# Patient Record
Sex: MALE | Race: WHITE | Employment: FULL TIME | URBAN - METROPOLITAN AREA
[De-identification: names, ages, dates, MRNs, and addresses within clinical notes are randomized per-mention and may not be internally consistent; named-entity substitution may affect disease eponyms.]

---

## 2019-02-19 ENCOUNTER — OFFICE VISIT (OUTPATIENT)
Dept: SLEEP MEDICINE | Facility: CLINIC | Age: 49
End: 2019-02-19
Payer: COMMERCIAL

## 2019-02-19 VITALS
SYSTOLIC BLOOD PRESSURE: 114 MMHG | HEART RATE: 65 BPM | HEIGHT: 69 IN | OXYGEN SATURATION: 96 % | BODY MASS INDEX: 32.38 KG/M2 | DIASTOLIC BLOOD PRESSURE: 76 MMHG | WEIGHT: 218.6 LBS

## 2019-02-19 DIAGNOSIS — G47.39 COMPLEX SLEEP APNEA SYNDROME: Primary | ICD-10-CM

## 2019-02-19 DIAGNOSIS — E66.811 OBESITY (BMI 30.0-34.9): ICD-10-CM

## 2019-02-19 PROCEDURE — 99204 OFFICE O/P NEW MOD 45 MIN: CPT | Performed by: INTERNAL MEDICINE

## 2019-02-19 RX ORDER — FLUTICASONE PROPIONATE 50 MCG
1 SPRAY, SUSPENSION (ML) NASAL DAILY
COMMUNITY

## 2019-02-19 ASSESSMENT — MIFFLIN-ST. JEOR: SCORE: 1859.68

## 2019-02-19 NOTE — PROGRESS NOTES
Changed pressure on resmed aircurve ASV machine to epap min 6, epap max 9, ps min 0, ps max19, rate auto manual adjustment.  Faxed signed CARLITOS , copy of split [facility], copy of split 2007, XOCHILT Hyatt, demographics, office notes, machine type, and mask type Resmed FF F20, size medium to Transfer Team at [facility] Attn: Rafat LOPEZ (transfer person of the day), [facility].  Asked for patient to receive chinstrap asap.

## 2019-02-19 NOTE — PATIENT INSTRUCTIONS
MY TREATMENT INFORMATION FOR SLEEP APNEA-  Frandy AGNIESZKA Interiano    MY CONTACT NUMBERS ARE:  [phone]  DOCTOR : Oli CHRISTIE [facility]  SLEEP CENTER :  [place]    Your sleep apnea is not controlled with ASV.  Pressure change will be done. Will get MRI of brain and ECHO for work up of complex sleep apnea.  Mask and supplies are ordered. Use chin strap  Continue use of ASV:  - Recommend using ASV every night for at least 7-8 hours each night  - Daytime naps are not advised, but use ASV if taking naps.    - Do not remove mask headgear when you go to bathroom at night, but just unplug the hose.    Objective measure goal  Compliance  Goal >70% (preferrably 100%)  Leak   Goal < 10% (less than 24 L/min)  AHI  Goal < 5 events per hour   Usage  Goal 7-8 hours.      Patient was advised not to drive if drowsy or sleepy.      Follow up in 2 months.      Your blood pressure was checked while you were in clinic today.  Please read the guidelines below about what these numbers mean and what you should do about them.  Your systolic blood pressure is the top number.  This is the pressure when the heart is pumping.  Your diastolic blood pressure is the bottom number.  This is the pressure in between beats.  If your systolic blood pressure is less than 120 and your diastolic blood pressure is less than 80, then your blood pressure is normal. There is nothing more that you need to do about it  If your systolic blood pressure is 120-139 or your diastolic blood pressure is 80-89, your blood pressure may be higher than it should be.  You should have your blood pressure re-checked within a year by a primary care provider.  If your systolic blood pressure is 140 or greater or your diastolic blood pressure is 90 or greater, you may have high blood pressure.  High blood pressure is treatable, but if left untreated over time it can put you at risk for heart attack, stroke, or kidney failure.  You should have your blood pressure re-checked by a  primary care provider within the next four weeks.  Your BMI is Body mass index is 31.83 kg/m .  Weight management is a personal decision.  If you are interested in exploring weight loss strategies, the following discussion covers the approaches that may be successful. Body mass index (BMI) is one way to tell whether you are at a healthy weight, overweight, or obese. It measures your weight in relation to your height.  A BMI of 18.5 to 24.9 is in the healthy range. A person with a BMI of 25 to 29.9 is considered overweight, and someone with a BMI of 30 or greater is considered obese. More than two-thirds of American adults are considered overweight or obese.  Being overweight or obese increases the risk for further weight gain. Excess weight may lead to heart disease and diabetes.  Creating and following plans for healthy eating and physical activity may help you improve your health.  Weight control is part of healthy lifestyle and includes exercise, emotional health, and healthy eating habits. Careful eating habits lifelong are the mainstay of weight control. Though there are significant health benefits from weight loss, long-term weight loss with diet alone may be very difficult to achieve- studies show long-term success with dietary management in less than 10% of people. Attaining a healthy weight may be especially difficult to achieve in those with severe obesity. In some cases, medications, devices and surgical management might be considered.  What can you do?  If you are overweight or obese and are interested in methods for weight loss, you should discuss this with your provider.     Consider reducing daily calorie intake by 500 calories.     Keep a food journal.     Avoiding skipping meals, consider cutting portions instead.    Diet combined with exercise helps maintain muscle while optimizing fat loss. Strength training is particularly important for building and maintaining muscle mass. Exercise helps reduce  stress, increase energy, and improves fitness. Increasing exercise without diet control, however, may not burn enough calories to loose weight.       Start walking three days a week 10-20 minutes at a time    Work towards walking thirty minutes five days a week     Eventually, increase the speed of your walking for 1-2 minutes at time    In addition, we recommend that you review healthy lifestyles and methods for weight loss available through the National Institutes of Health patient information sites:  http://win.niddk.nih.gov/publications/index.htm    And look into health and wellness programs that may be available through your health insurance provider, employer, local community center, or darshan club.

## 2019-02-19 NOTE — NURSING NOTE
"Chief Complaint   Patient presents with     Consult     trouble with asv, prev study [facility] 2016, ss [facility] 2007  having central apneas, retested.  still removes the asv.         Initial /76   Pulse 65   Ht 1.765 m (5' 9.49\")   Wt 99.2 kg (218 lb 9.6 oz)   SpO2 96%   BMI 31.83 kg/m   Estimated body mass index is 31.83 kg/m  as calculated from the following:    Height as of this encounter: 1.765 m (5' 9.49\").    Weight as of this encounter: 99.2 kg (218 lb 9.6 oz).    Medication Reconciliation: complete    Neck circumference: 17 inches / 42centimeters.    DME:yes asv resmed     ESS 9  "

## 2019-02-19 NOTE — PROGRESS NOTES
Sleep Center [facility]  Outpatient Sleep Medicine Consultation  February 19, 2019      Name: Frandy Interiano MRN# [id number]   Age: 48 year old YOB: 1970     Date of Consultation: February 19, 2019  Consultation is requested by: Adrián Wilcox MD  No address on file  Primary care provider: Nai Gayle  Home clinic: [facility] [address] [place] [address]       Reason for Sleep Consult:     Frandy Interiano is a 48 year old male for reevaluation of sleep apnea and having trouble with his ASV machine and establish care.         Assessment and Plan:     Summary Sleep Diagnoses/Recommendations:    1.  Complex sleep Apnea:  - Non compliance of PAP use with residual AHI of 8.2/hr. patient is intolerant with his ASV machine usage, history of failure of auto CPAP on BiPAP due to central sleep apnea.  -Changes of ASV pressure to EPAP min 6, EPAP max 9, PS min 0 and PS max 19 cmH2O as he was on low pressures that does not improve patient's tolerance of machine usage and residual AHI. If above fails, will consider repeat ASV titration in lab sleep study.  -Obtain 2D echocardiogram for ejection fraction as he uses ASV machine per guidelines. Also obtain MRI of brain for Chiari Malformation for work up of central apnea.  - he has bloated air with machine due to mouth breathing, use full face mask with chin strap. Avoid supine sleep too.  - Clinical response: poor  - Recommend using CPAP every night for at least 7-8 hours each night  - Daytime naps are not advised, but use CPAP if taking naps  - Follow up in sleep clinic in 2 months.    2. Obesity:  Counseled regarding weight loss through diet modification and increased physical activity. Patient was given instuctions of weight loss and advised to follow up her PCP for further weight loss interventions.      Orders Placed This Encounter   Procedures     Comprehensive DME     MR Brain w/o & w Contrast     Echocardiogram Complete       Summary  Counseling:  See instructions    Counseling included a comprehensive review of diagnostic and therapeutic strategies as well as risks of inadequate therapy.  Educational materials provided in instructions.    All questions were answered.  The patient indicates understanding of the above issues and agrees with the plan set forth.           History of Present Illness:     Frandy Interiano is a 48 year old male with history of complex sleep apnea, depression and anxiety disorder who presents to the [facility] in [place] for evaluation of sleep apnea and establish care intolerance with ASV machine.   Patient was diagnosed with complex sleep apnea in the 2007 with AHI 62.1/hr (OSMAR 24/hr) and was treated with auto-CPAP and BiPAP in the past but failed. He had sleep study date on 9/24/16 and was diagnosed with complex sleep apnea (predominantly central Sleep Apnea). The AHI (Apnea Hypopnea Index) was 89/hour; the OSMAR was 57/hr. The lowest SaO2 was 81%. He was prescribed ResMed ASV (Adaptive Servo Ventilation) with setting is EPAP 7-11, PS 3-8 cmH2O. patient was intolerant with his ASV device for 2 hours every night.  His clinic visit 5/24/17, his ASV pressures was changed to EPAP 7-10 and pressure support 0-5 cm H2O, in addition recommended REMzzzs mask liners to minimize leak.  No echocardiographic is available epic everywhere. He changed his ASV pressures EPAP 6-9 and PS 3-8 cmH2O as he felt bloated with air.  Patient denies taking narcotics during the prior sleep studies.   Patient remains intolerant to ASV machine, he uses most of the night but he removes and after 1 hour/half of his usage. He bloated when he on high pressures.  Patient had witnessed apnea, snoring, waking up gasping air, morning headaches, dry mouth/throat.  The patient sleepiness is affecting his daytime job and tasks. His symptoms worse with supine position.    Please see below for sleep ROS details.    PREVIOUS IN- LAB or HOME SLEEP  STUDIES:  Date: 10/18/2007  TYPE: PSG  AHI: 62.1/hr  Central apnea Index: 24/hr  BMI:  Intervention: auto-CPAP  Lowest O2 saturation: 81%    Date: 9/24/2016 done [facility]  TYPE: PSG  AHI: 89/hr  Central apnea Index: 57/hr  Intervention: ASV EPAP 7-11, PS 3-8 cmH2O.   Lowest O2 saturation: 81%    CPAP COMPLIANCE:  Dates:  1/21/2018-  2/18/2019         0 % >4hour use     Days used: 57/60  Average daily use: 1.16 hrs  Leak 95% percentile: 0.4 l/min  Residual AHI: 8.2 /hr  Pressure:  ASV  pressures EPAP 6-9 and PS 3-8 cmH2O      PAP machine: ResMed    Mask: full face mask  Chin strap: No  Leak: No  Humidity: Yes    Difficulties with therapy:    [x] Difficulty tolerating the pressure, use to have bloated   [-] Epistaxis:   [-] Nasal congestion   [-] Dry mouth: occasional  [x] Mouth breathing:   [-] Pain/skin breakdown:     Improvements noted with ASV:   [+] waking up more refreshed  [+] sleeping better with less arousals  [+] nocturia improved   [+] improved energy level during the day      SLEEP-WAKE SCHEDULE:     Frandy Interiano     -Describes themself as a morning person;      -ON WEEKDAYS, goes to sleep at 9:00 PM during the week; awakens  5:30 AM with an alarm; falls asleep in 5-10 minutes; denies difficulty falling asleep.     -ON WEEKENDS, goes to sleep at 10:00 PM and wakes up at 8:00 AM without an alarm; falls asleep in 5-10 minutes.       -Awakens 2-3 times a night for 5 minutes before falling back to sleep; awakens to go to the bathroom and uncertain reasons.      -Total sleep time: 6-8 hours per night.    -Naps 0 times/days per week      BEDTIME ACTIVITIES AND SHIFT WORK:    Frandy Interiano    -does not use electronics in bed, watch TV in bed and read in bed.     -does not do shift work.  He/she works day shifts.      Occupation: [employer]       SLEEP APNEA: Stopbang score: 5       INSOMNIA:  Insomnia severity score: 12       SLEEPINESS: [place] sleepiness scale  (ESS):  9   Drowsy driving/near accidents: No          PHQ9: N/A    SLEEP COMPLAINTS:   Snoring- 5 days/week  Witness apnea: Yes  Gasping/Choking: Yes  Excessive daytime sleep: Yes  Toss/turn: No  Excessive tiredness/fatigue:  Yes  Morning headaches: Yes  Dry mouth/throat: Yes  Dyspnea: No  Coexisting Lung disease: No    Coexisting Heart disease: No    Does patient have a bed partner: Yes  Has bed partner been sleeping separately because of snoring:  No            RLS Screen: When you try to relax in the evening or sleep at  night, do you ever have unpleasant, restless feelings in your  legs that can be relieved by walking or movement? No    Periodic limb movement: No    Narcolepsy:       denies sudden urges of sleep attacks     denies cataplexy     denies sleep paralysis      denies hallucinations     Sleep Behaviors:     denies leg symptoms/movements     denies motor restlessness     denies night terrors     denies bruxism     denies automatic behaviors    Other subjective complaints:     denies anxiety or rumination      denies pain and discomfort at  night     denies waking up with heart pounding or racing     denies GERD/heartburn         Parasomnia:   NREM - denies recurrent persistent confusional arousal, night eating, sleep walking or sleep terrors   REM  - denies dream enactment; no injuries.     Safety: None             Medications:     Current Outpatient Medications   Medication Sig     fluticasone (FLONASE) 50 MCG/ACT nasal spray Spray 1 spray into both nostrils daily     hydrocodone-acetaminophen (VICODIN) 5-500 MG per tablet Take 1-2 tablets by mouth every 6 hours as needed for pain. (Patient not taking: Reported on 2/19/2019)     NO ACTIVE MEDICATIONS .     sildenafil (VIAGRA) 50 MG tablet Take 1 tablet by mouth daily as needed for erectile dysfunction.     No current facility-administered medications for this visit.         Medication that can affect sleep: Vicodin, but no taking    No Known  Allergies         Past Medical History:     Does not need 02 supplement at night     Past Medical History:   Diagnosis Date     Dysfunction of eustachian tube      Obesity, unspecified     waist 38 inches               Past Surgical History:    No previous upper airway surgery     Past Surgical History:   Procedure Laterality Date     EXCISE MASS NECK  2/10/2012    Procedure:EXCISE MASS NECK; EXCISION OF LEFT POSTERIOR NECK SEBACEOUS CYST; Surgeon:VJ ROQUE; Location:[facility]     foreign body extraction[       HC TOOTH EXTRACTION W/FORCEP  age 17     SURGICAL HISTORY OF -   age 11    removal piece metal left leg, playing wit pipe bombs            Social History:     Social History     Tobacco Use     Smoking status: Never Smoker     Smokeless tobacco: Never Used   Substance Use Topics     Alcohol use: No         Chemical History:     Tobacco: No     Uses 2 sodas/day. Last caffeine intake is usually before noon    EtOH: No   Recreational Drugs: No    Psych Hx:   Depression anxiety disorder    Current dangers to self or others: None           Family History:     Family History   Problem Relation Age of Onset     Respiratory Brother         sleep apnea     Cancer - colorectal No family hx of      Gastrointestinal Disease No family hx of      Diabetes Brother      Heart Disease Father      Neurologic Disorder Paternal Grandmother      Asthma No family hx of      C.A.D. Mother      C.A.D. Father      Hypertension No family hx of      Prostate Cancer No family hx of      Anesthesia Reaction No family hx of      Blood Disease No family hx of      Eye Disorder No family hx of      Thyroid Disease Brother         Thyroid cancer      Thyroid Disease Brother         Thyroid cancer        Sleep Family Hx:        RLS- No  RONA - 2 brother  Insomnia - No  Parasomnia - No         Review of Systems:     A complete 10 point review of systems was negative other than HPI or as commented below:   Patient denies chest pain,   "wheezing, abdominal pain, n&v, fever, chills, dysuria, leg pain or swelling. Patient is also denies ear pain, sore throat, dry cough.  Patient has postnasal drip, running nose, active cough, dyspnea with activity.    Frandy Interiano has gained 0-5 pounds in the last year.            Physical Examination:   /76   Pulse 65   Ht 1.765 m (5' 9.49\")   Wt 99.2 kg (218 lb 9.6 oz)   SpO2 96%   BMI 31.83 kg/m       Neck Circumference: 42 cm   Constitutional: . Awake, alert, cooperative, in no apparent distress  Mood: euthymic; affect congruent with full range and intensity.  Attention/Concentration:  Normal   Eyes: Pupils round and reactive. No icterus.  ENT: Mallampati Class: IV.   Tonsillar Stage: 1  hidden by pillars  Clear nasal passages. Enlarged inferior turbinates. No deviated septum.  Oropharynx: No high arched palate. No pharyngeal erythema or exudates, elongated uvula. No lateral narrowing  Tongue: No relative macroglossia   Dentition: Good.  Dentures: None  Neck: Supple, no thyroid enlargement.   Cardiovascular: Regular S1 and S2, no murmurs or gallops.    Pulmonary:  Chest symmetric, lungs reveal decreased breath sounds at bases. No rales or wheezes.  Abdomen: Soft, obese, non tender.  Extremities:  No pedal edema.  Muscle/joint: Strength and tone normal   Skin:  No rash or significant lesions examined areas of skin.   Neurologic: Alert, oriented x3, no focal neurological deficit.           Data: All pertinent previous laboratory data reviewed     No results found for: PH, PHARTERIAL, PO2, HB1XNLLOPCN, SAT, PCO2, HCO3, BASEEXCESS, KIM, BEB  Lab Results   Component Value Date    TSH 1.84 10/29/2007     Lab Results   Component Value Date    GLC 84 11/22/2011    GLC 91 10/29/2007     Lab Results   Component Value Date    HGB 17.9 (H) 10/29/2007     Lab Results   Component Value Date    BUN 11 10/29/2007    CR 1.23 10/29/2007     Lab Results   Component Value Date    CO2 29 10/29/2007     No results found " for: KYE      Echocardiography: No    Chest x-ray: 3/8/2006  [facility]  Result Narrative   There is slight prominence of the makings at the right lung base.  Elsewhere, the lungs are clear and the heart size normal.  CONCLUSION:  Questionable early infiltrate at the right lung base.       PFT: No        Copy to: Nai Gayle  Copy to: Referred Self      Oli Jimenez MD 2/19/2019   [facility]  [address], [place], [place] [address] [phone] Clinic    Total time spent with patient: 45 minutes with this patient today in which 25 minutes was spent in counseling/coordination of care and going over planned testing and recommendations.

## 2019-02-20 ENCOUNTER — TELEPHONE (OUTPATIENT)
Dept: SLEEP MEDICINE | Facility: CLINIC | Age: 49
End: 2019-02-20

## 2019-02-26 ENCOUNTER — HOSPITAL ENCOUNTER (OUTPATIENT)
Dept: CARDIOLOGY | Facility: CLINIC | Age: 49
Discharge: HOME | End: 2019-02-26
Attending: INTERNAL MEDICINE | Admitting: INTERNAL MEDICINE
Payer: COMMERCIAL

## 2019-02-26 DIAGNOSIS — G47.39 COMPLEX SLEEP APNEA SYNDROME: ICD-10-CM

## 2019-02-26 PROCEDURE — 93306 TTE W/DOPPLER COMPLETE: CPT

## 2019-02-26 PROCEDURE — 93306 TTE W/DOPPLER COMPLETE: CPT | Mod: 26 | Performed by: INTERNAL MEDICINE

## 2019-03-04 ENCOUNTER — HOSPITAL ENCOUNTER (OUTPATIENT)
Dept: GENERAL RADIOLOGY | Facility: CLINIC | Age: 49
End: 2019-03-04
Attending: FAMILY MEDICINE
Payer: COMMERCIAL

## 2019-03-04 DIAGNOSIS — T15.90XD FOREIGN BODY IN EYE, UNSPECIFIED LATERALITY, SUBSEQUENT ENCOUNTER: ICD-10-CM

## 2019-03-04 PROCEDURE — 70030 X-RAY EYE FOR FOREIGN BODY: CPT

## 2019-03-22 ENCOUNTER — TELEPHONE (OUTPATIENT)
Dept: SLEEP MEDICINE | Facility: CLINIC | Age: 49
End: 2019-03-22

## 2019-03-22 NOTE — TELEPHONE ENCOUNTER
Patient went in for his MRI and was not able to complete it.  He was told to get a sedative medication from you and has rescheduled the MRI for 3/27/2019.  He would like the medication sent to the [facility] at the [facility].    Please notify patient when medication has been sent to the pharmacy.  Thank you.    Sabrina Damon  Sleep Center /  [phone]

## 2019-03-25 RX ORDER — LORAZEPAM 1 MG/1
1 TABLET ORAL ONCE
Qty: 1 TABLET | Refills: 0 | Status: SHIPPED | OUTPATIENT
Start: 2019-03-25 | End: 2019-03-25

## 2019-03-27 ENCOUNTER — HOSPITAL ENCOUNTER (OUTPATIENT)
Dept: MRI IMAGING | Facility: CLINIC | Age: 49
Discharge: HOME | End: 2019-03-27
Attending: INTERNAL MEDICINE | Admitting: INTERNAL MEDICINE
Payer: COMMERCIAL

## 2019-03-27 PROCEDURE — 25500064 ZZH RX 255 OP 636: Performed by: INTERNAL MEDICINE

## 2019-03-27 PROCEDURE — 70553 MRI BRAIN STEM W/O & W/DYE: CPT

## 2019-03-27 PROCEDURE — A9585 GADOBUTROL INJECTION: HCPCS | Performed by: INTERNAL MEDICINE

## 2019-03-27 RX ORDER — GADOBUTROL 604.72 MG/ML
10 INJECTION INTRAVENOUS ONCE
Status: COMPLETED | OUTPATIENT
Start: 2019-03-27 | End: 2019-03-27

## 2019-03-27 RX ADMIN — GADOBUTROL 10 ML: 604.72 INJECTION INTRAVENOUS at 07:42

## 2019-04-03 ENCOUNTER — OFFICE VISIT (OUTPATIENT)
Dept: SLEEP MEDICINE | Facility: CLINIC | Age: 49
End: 2019-04-03
Payer: COMMERCIAL

## 2019-04-03 VITALS
SYSTOLIC BLOOD PRESSURE: 116 MMHG | RESPIRATION RATE: 16 BRPM | DIASTOLIC BLOOD PRESSURE: 71 MMHG | BODY MASS INDEX: 32.07 KG/M2 | HEART RATE: 63 BPM | WEIGHT: 224 LBS | OXYGEN SATURATION: 97 % | HEIGHT: 70 IN

## 2019-04-03 DIAGNOSIS — G47.39 COMPLEX SLEEP APNEA SYNDROME: Primary | ICD-10-CM

## 2019-04-03 DIAGNOSIS — E66.811 OBESITY (BMI 30.0-34.9): ICD-10-CM

## 2019-04-03 PROCEDURE — 99213 OFFICE O/P EST LOW 20 MIN: CPT | Performed by: INTERNAL MEDICINE

## 2019-04-03 ASSESSMENT — MIFFLIN-ST. JEOR: SCORE: 1884.37

## 2019-04-03 NOTE — PROGRESS NOTES
[facility]  Outpatient [facility] Medicine Follow-up  April 3, 2019      Name: Frandy Interiano MRN# [id number]   Age: 48 year old YOB: 1970   Date of visit: April 3, 2019  Primary care provider: Nai Gayle         Assessment and Plan:     1. Complex Sleep Apnea, predominantly central apnea:   -Partial compliance of ASV use with low residual AHI.  - PAP Machine download is reviewed as below  - Clinical response: poor due to sinus issue and sleep disturbance. We may repeat split study to determine the persistence of central apnea as no cause is identifiable. MRI/ECHO were negative.   - Recommend using ASV every night for at least 7-8 hours each night  - Daytime naps are not advised, but use CPAP if taking naps  - Patient was advised not to drive if drowsy or sleepy.  - Follow up in sleep clinic in 12 months.    2. Obesity: Encouraged patient to lose weight. Counseled regarding weight loss through diet modification and increased physical activity. Patient was given nstuctions of weight loss and advised to follow up her PCP for further weight loss interventions. Weight loss instructions given.      No orders of the defined types were placed in this encounter.        Summary Counseling:  New sleep schedule recommendation: given    Check out [email or web address]    All questions were answered.  The patient indicates understanding of the above issues and agrees with the plan set forth.           Chief Complaint:    Routine Follow up            History of Present Illness:     Frandy Interiano is a 48 year old male with history of complex sleep apnea who comes to [facility] for follow up. Please see H&P for his presenting sleep symptoms for additional details.  Patient was diagnosed with complex sleep apnea in the 2007 with AHI 62.1/hr (OSMAR 24/hr) and was treated with auto-CPAP and BiPAP in the past but failed. He had sleep study date on 9/24/16 and was diagnosed  with complex sleep apnea (predominantly central Sleep Apnea). The AHI (Apnea Hypopnea Index) was 89/hour; the OSMAR was 57/hr. The lowest SaO2 was 81%. He was prescribed ResMed ASV (Adaptive Servo Ventilation) with setting is EPAP 7-11, PS 3-8 cmH2O. patient was intolerant with his ASV device for 2 hours every night.  His clinic visit 5/24/17, his ASV pressures was changed to EPAP 7-10 and pressure support 0-5 cm H2O, in addition recommended REMzzzs mask liners to minimize leak.  No echocardiographic is available epic everywhere. He changed his ASV pressures EPAP 6-9 and PS 3-8 cmH2O as he felt bloated with air.   His first visit this clinic 2/90/19 patient had a residual AHI of 9.8 events an hour and he was intolerant with his ASV machine. His ASV pressure  changed to EPAP min 6, EPAP max 9, PS min 0 and PS max 19 cmH2O as he was on low pressures that does not improve patient's tolerance of machine usage and residual AHI.   Today, patient is using his ASV machine every night but only for 2 hours and 48 minutes average and has a low residual AHI of 4.9 events an hour. Some night, he has sinus issue and he removes the mask after 1-2 hours. He had difficulty staying and falling asleep.  For central sleep apnea work up , he had echocardiogram MRI of the brain obtained [facility] [place] were negative.      PREVIOUS IN- LAB or HOME SLEEP STUDIES:  Date: 10/18/2007  TYPE: PSG  AHI: 62.1/hr  Central apnea Index: 24/hr  Intervention: auto-CPAP  Lowest O2 saturation: 81%     Date: 9/24/2016 done [facility]  TYPE: PSG  AHI: 89/hr  Central apnea Index: 57/hr  Intervention: ASV EPAP 7-11, PS 3-8 cmH2O.   Lowest O2 saturation: 81%        CPAP Compliance:  Dates:  3/3/2019- 4/1/2019       27 % >4hour use   Days used: 30/30  Average daily use (days used): 2.48 hrs  Leak 95 percentile: 0 L/min  Residual AHI: 4.9/hr  Pressure:  ASV 9/9/0/19 cmH2O    [place] Sleepiness Scale score today: 7/24  [place] Sleepiness Scale score  last visit: 9/24     PAP machine: ASV ResMed    Interface:  Mask: Full facemask  Chin strap: No  Leak: No  Humidity: Yes    Difficulties with therapy:    [-] Difficulty tolerating the pressure  [-] Epistaxis:   [x] Nasal congestion   [-] Dry mouth:  [x] Mouth breathing:   [-] Pain/skin breakdown:     Improvements noted with ASV   [+] waking up more refreshed  [+] sleeping better with less arousals  [+] nocturia improved   [+] improved energy level during the day    SLEEP-WAKE SCHEDULE: unchanged    Other sleep problems: None     Drowsy driving / near incidents: No    Medications that affect sleep: No            Medications:     Current Outpatient Medications   Medication Sig     fluticasone (FLONASE) 50 MCG/ACT nasal spray Spray 1 spray into both nostrils daily     hydrocodone-acetaminophen (VICODIN) 5-500 MG per tablet Take 1-2 tablets by mouth every 6 hours as needed for pain. (Patient not taking: Reported on 2/19/2019)     NO ACTIVE MEDICATIONS .     sildenafil (VIAGRA) 50 MG tablet Take 1 tablet by mouth daily as needed for erectile dysfunction.     No current facility-administered medications for this visit.         No Known Allergies         Past Medical History:     Past Medical History:   Diagnosis Date     Dysfunction of eustachian tube      Obesity, unspecified     waist 38 inches             Past Surgical History:      Past Surgical History:   Procedure Laterality Date     EXCISE MASS NECK  2/10/2012    Procedure:EXCISE MASS NECK; EXCISION OF LEFT POSTERIOR NECK SEBACEOUS CYST; Surgeon:VJ ROQUE; Location: [place]     foreign body extraction[       HC TOOTH EXTRACTION W/FORCEP  age 17     SURGICAL HISTORY OF -   age 11    removal piece metal left leg, playing wit pipe bombs       Social History     Tobacco Use     Smoking status: Never Smoker     Smokeless tobacco: Never Used   Substance Use Topics     Alcohol use: No       Family History   Problem Relation Age of Onset     Respiratory Brother        "  sleep apnea     Cancer - colorectal No family hx of      Gastrointestinal Disease No family hx of      Diabetes Brother      Heart Disease Father      Neurologic Disorder Paternal Grandmother      Asthma No family hx of      C.A.D. Mother      C.A.D. Father      Hypertension No family hx of      Prostate Cancer No family hx of      Anesthesia Reaction No family hx of      Blood Disease No family hx of      Eye Disorder No family hx of      Thyroid Disease Brother         Thyroid cancer      Thyroid Disease Brother         Thyroid cancer            Physical Examination:   /71   Pulse 63   Resp 16   Ht 1.765 m (5' 9.5\")   Wt 101.6 kg (224 lb)   SpO2 97%   BMI 32.60 kg/m              Data: All pertinent previous laboratory data reviewed     No results found for: PH, PHARTERIAL, PO2, [id number], SAT, PCO2, HCO3, BASEEXCESS, KIM, BEB  Lab Results   Component Value Date    TSH 1.84 10/29/2007     Lab Results   Component Value Date    GLC 84 11/22/2011    GLC 91 10/29/2007     Lab Results   Component Value Date    HGB 17.9 (H) 10/29/2007     Lab Results   Component Value Date    BUN 11 10/29/2007    CR 1.23 10/29/2007     No results found for: KYE      He will follow up with me in about 12 month(s).         Copy to: Nai Gayle MD 4/3/2019     [facility]  [id number] [place] [id number]       Fifteen minutes spent with patient, all of which were spent face-to-face counseling, consulting, coordinating plan of care and going over PAP download/sleep study and chart review.        "

## 2019-04-03 NOTE — PATIENT INSTRUCTIONS
MY TREATMENT INFORMATION FOR SLEEP APNEA-  Frandy Interiano    MY CONTACT NUMBERS ARE:  [phone]  DOCTOR : Oli [facility]RI[facility]  SLEEP CENTER :  [place]    Your sleep apnea is controlled with ASV.   Mask and supplies are ordered    Continue use of ASV:  - Recommend using ASV every night for at least 7-8 hours each night  - Daytime naps are not advised, but use ASV if taking naps.    - Do not remove mask headgear when you go to bathroom at night, but just unplug the hose.    Objective measure goal  Compliance  Goal >70% (preferrably 100%)  Leak   Goal < 10% (less than 24 L/min)  AHI  Goal < 5 events per hour   Usage  Goal 7-8 hours.      Patient was advised not to drive if drowsy or sleepy.      Follow up in 12 months.          Your BMI is Body mass index is 32.6 kg/m .  Weight management is a personal decision.  If you are interested in exploring weight loss strategies, the following discussion covers the approaches that may be successful. Body mass index (BMI) is one way to tell whether you are at a healthy weight, overweight, or obese. It measures your weight in relation to your height.  A BMI of 18.5 to 24.9 is in the healthy range. A person with a BMI of 25 to 29.9 is considered overweight, and someone with a BMI of 30 or greater is considered obese. More than two-thirds of American adults are considered overweight or obese.  Being overweight or obese increases the risk for further weight gain. Excess weight may lead to heart disease and diabetes.  Creating and following plans for healthy eating and physical activity may help you improve your health.  Weight control is part of healthy lifestyle and includes exercise, emotional health, and healthy eating habits. Careful eating habits lifelong are the mainstay of weight control. Though there are significant health benefits from weight loss, long-term weight loss with diet alone may be very difficult to achieve- studies show long-term success with dietary  management in less than 10% of people. Attaining a healthy weight may be especially difficult to achieve in those with severe obesity. In some cases, medications, devices and surgical management might be considered.  What can you do?  If you are overweight or obese and are interested in methods for weight loss, you should discuss this with your provider.     Consider reducing daily calorie intake by 500 calories.     Keep a food journal.     Avoiding skipping meals, consider cutting portions instead.    Diet combined with exercise helps maintain muscle while optimizing fat loss. Strength training is particularly important for building and maintaining muscle mass. Exercise helps reduce stress, increase energy, and improves fitness. Increasing exercise without diet control, however, may not burn enough calories to loose weight.       Start walking three days a week 10-20 minutes at a time    Work towards walking thirty minutes five days a week     Eventually, increase the speed of your walking for 1-2 minutes at time    In addition, we recommend that you review healthy lifestyles and methods for weight loss available through the National Institutes of Health patient information sites:  http://win.niddk.nih.gov/publications/index.htm    And look into health and wellness programs that may be available through your health insurance provider, employer, local community center, or darshan club.    Weight management plan: Patient was referred to their PCP to discuss a diet and exercise plan.     Your blood pressure was checked while you were in clinic today.  Please read the guidelines below about what these numbers mean and what you should do about them.  Your systolic blood pressure is the top number.  This is the pressure when the heart is pumping.  Your diastolic blood pressure is the bottom number.  This is the pressure in between beats.  If your systolic blood pressure is less than 120 and your diastolic blood pressure  is less than 80, then your blood pressure is normal. There is nothing more that you need to do about it  If your systolic blood pressure is 120-139 or your diastolic blood pressure is 80-89, your blood pressure may be higher than it should be.  You should have your blood pressure re-checked within a year by a primary care provider.  If your systolic blood pressure is 140 or greater or your diastolic blood pressure is 90 or greater, you may have high blood pressure.  High blood pressure is treatable, but if left untreated over time it can put you at risk for heart attack, stroke, or kidney failure.  You should have your blood pressure re-checked by a primary care provider within the next four weeks.

## 2019-04-03 NOTE — NURSING NOTE
"Chief Complaint   Patient presents with     RECHECK     f/u ASV, patient in [facility] - 2nd Log in.       Initial /71   Pulse 63   Resp 16   Ht 1.765 m (5' 9.5\")   Wt 101.6 kg (224 lb)   SpO2 97%   BMI 32.60 kg/m   Estimated body mass index is 32.6 kg/m  as calculated from the following:    Height as of this encounter: 1.765 m (5' 9.5\").    Weight as of this encounter: 101.6 kg (224 lb).    Medication Reconciliation: complete    Laurence Sauer LPN      "

## 2019-10-01 ENCOUNTER — HEALTH MAINTENANCE LETTER (OUTPATIENT)
Age: 49
End: 2019-10-01

## 2021-01-15 ENCOUNTER — HEALTH MAINTENANCE LETTER (OUTPATIENT)
Age: 51
End: 2021-01-15

## 2021-02-10 ENCOUNTER — VIRTUAL VISIT (OUTPATIENT)
Dept: SLEEP MEDICINE | Facility: CLINIC | Age: 51
End: 2021-02-10
Payer: COMMERCIAL

## 2021-02-10 VITALS — WEIGHT: 215 LBS | BODY MASS INDEX: 30.78 KG/M2 | HEIGHT: 70 IN

## 2021-02-10 DIAGNOSIS — G47.39 COMPLEX SLEEP APNEA SYNDROME: Primary | ICD-10-CM

## 2021-02-10 DIAGNOSIS — E66.811 OBESITY (BMI 30.0-34.9): ICD-10-CM

## 2021-02-10 PROCEDURE — 99215 OFFICE O/P EST HI 40 MIN: CPT | Mod: 95 | Performed by: PHYSICIAN ASSISTANT

## 2021-02-10 ASSESSMENT — MIFFLIN-ST. JEOR: SCORE: 1841.48

## 2021-02-10 NOTE — PATIENT INSTRUCTIONS
Your BMI is Body mass index is 30.85 kg/m .  Weight management is a personal decision.  If you are interested in exploring weight loss strategies, the following discussion covers the approaches that may be successful. Body mass index (BMI) is one way to tell whether you are at a healthy weight, overweight, or obese. It measures your weight in relation to your height.  A BMI of 18.5 to 24.9 is in the healthy range. A person with a BMI of 25 to 29.9 is considered overweight, and someone with a BMI of 30 or greater is considered obese. More than two-thirds of American adults are considered overweight or obese.  Being overweight or obese increases the risk for further weight gain. Excess weight may lead to heart disease and diabetes.  Creating and following plans for healthy eating and physical activity may help you improve your health.  Weight control is part of healthy lifestyle and includes exercise, emotional health, and healthy eating habits. Careful eating habits lifelong are the mainstay of weight control. Though there are significant health benefits from weight loss, long-term weight loss with diet alone may be very difficult to achieve- studies show long-term success with dietary management in less than 10% of people. Attaining a healthy weight may be especially difficult to achieve in those with severe obesity. In some cases, medications, devices and surgical management might be considered.  What can you do?  If you are overweight or obese and are interested in methods for weight loss, you should discuss this with your provider.     Consider reducing daily calorie intake by 500 calories.     Keep a food journal.     Avoiding skipping meals, consider cutting portions instead.    Diet combined with exercise helps maintain muscle while optimizing fat loss. Strength training is particularly important for building and maintaining muscle mass. Exercise helps reduce stress, increase energy, and improves fitness.  Increasing exercise without diet control, however, may not burn enough calories to loose weight.       Start walking three days a week 10-20 minutes at a time    Work towards walking thirty minutes five days a week     Eventually, increase the speed of your walking for 1-2 minutes at time    In addition, we recommend that you review healthy lifestyles and methods for weight loss available through the National Institutes of Health patient information sites:  http://win.niddk.nih.gov/publications/index.htm    And look into health and wellness programs that may be available through your health insurance provider, employer, local community center, or darshan club.    Weight management plan: Patient was referred to their PCP to discuss a diet and exercise plan.

## 2021-02-10 NOTE — PROGRESS NOTES
Frandy is a 50 year old who is being evaluated via a billable video visit.      How would you like to obtain your AVS? MyChart  If the video visit is dropped, the invitation should be resent by: Send to e-mail at: [email or web address]  Will anyone else be joining your video visit? No       Lise Granger, PIERCE on 2/10/2021 at 9:55 AM    Video Start Time: 10:32AM     [facility]   Outpatient Sleep Medicine  February 10, 2021      Name: Frandy Interiano MRN# [id number]   Age: 50 year old YOB: 1970            Assessment and Plan:   1. Complex sleep apnea syndrome  2. Obesity (BMI 30.0-34.9)    Patient's sleep apnea is adequately managed and well treated with current ASV settings with low residual AHI of 2.0 events per hour. Compliance is excellent. Tolerating ASV very well. Daytime symptoms and nighttime sleep quality are improved. No indication to change pressure settings today. Will continue nightly therapy. Prescription renewed for mask/supplies.   - Comprehensive DME    Frandy Interiano will follow up in about 1 year, or sooner as needed.          Chief Complaint      Chief Complaint   Patient presents with     CPAP Follow Up            History of Present Illness:     Frandy Interiano is a 50 year old male who presents to the clinic for follow-up of their severe complexed sleep apnea treated with ASV. Other past medical history significant for anxiety, depression, allergic rhinitis, obesity.     Patient was originally diagnosed with complex sleep apnea via PSG on 10/18/2007 with AHI 62.1/hr (OSMAR 24/hr) and treatment initiated withauto-CPAP and BiPAP but failed. He had repeat sleep study dated 9/24/16 at [facility] revealing AHI 89/hour (OSMAR was 57/hr), lowest SaO2 was 81%. He was prescribed ResMed ASV (Adaptive Servo Ventilation) with settingEPAP 7-11, PS 3-8 cmH2O. Patient was intolerant with his ASV device, 2 hours every night. His clinic visit 5/24/17,  "his ASV pressures was changed to EPAP 7-10 and pressure support 0-5 cm H2O, in addition recommended REMzzzs mask liners to minimize leak. Later changed ASV pressures to EPAP 6-9 and PS 3-8 cmH2O as he felt bloated with air. His first clinic visit with our practice was 19 and patient had a residual AHI of 9.8 events an hour and he was intolerant with his ASV machine, only able to wear for 1 hour.  changed his pressures to EPAP min 6, EPAP max 9, PS min 0 and PS max 19 cmH2O. Echo and brain MRI ordered for central apnea workup, both unrevealing and normal.     Primary reason for today's visit is to obtain new PAP mask/supplies. Last seen 2019 and prescription is . Needs new headgear and mask. Reports he has been doing very well in regards to his ASV the past couple years,  tolerance of ASV is \"off and on\" but has been going \"pretty darn good\" the past number of months. No complaints or concerns today - \"I am happy where I am at now\".     Patient is using a full face mask. The mask is comfortable. The mask is not leaking, but is wearing headgear \"very tight\" to make sure it doesn't leak. They are not snoring with the mask on. They are not having gasp arousals.  They are not having significant oral/nasal dryness or epistaxis.  They are not having pain/skin breakdown. The pressure settings are comfortable. No longer feeling bloated on waking.     Bedtime is typically 12-12:30AM. Usually it takes about 10 minutes to fall asleep with the mask on. Wake time is typically 7:40AM. Patient is using PAP therapy 7 hours per night. The patient is usually getting 7 hours of sleep per night.    Improvements noted with CPAP include sleeping better with less arousals, improved energy during the day, no longer waking with \"heavy chest feeling\" in the morning.     ResMed AirCurve 10 ASV - ASV auto, Min EPAP 6cm, Max EPAP 9cm, Min PS 0cm, Max PS [id number] download (1/10/21-21): 30 total days of use. 0 nonuse days. " "29 days with >4 hours use.  Average use 7 hours 8 minutes per day. Median Leak 0 L/min. 95%ile Leak 0.9 L/min. AHI 2.0.     SCALES:    INSOMNIA: Insomnia severity index: 8    SLEEPINESS: [place] sleepiness scale: 5    Past medical/surgical history, family history, social history, medications and allergies were reviewed.           Physical Examination:   Ht 1.778 m (5' 10\")   Wt 97.5 kg (215 lb)   BMI 30.85 kg/m    General appearance: Awake, alert, cooperative. Well groomed. Sitting comfortably in chair. In no apparent distress.  HEENT: Head: Normocephalic, atraumatic. Eyes:Conjunctiva clear. Sclera normal. Nose: External appearance without deformity.   Pulmonary:  Able to speak easily in full sentences. No cough or wheeze.   Skin:  No rashes or significant lesions on visible skin.   Neurologic: Alert, oriented x3.   Psychiatric: Mood euthymic. Affect congruent with full range and intensity.    CC:  Nai Gayle PA-C  Feb 10, 2021     [facility]  [id number] [place] [address] [facility] [place]  [address] [address]    Chart documentation was completed, in part, with [employer] voice-recognition software. Even though reviewed, some grammatical, spelling, and word errors may remain.      Video-Visit Details    Type of service:  Video Visit    Video End Time:10:54PM    Originating Location (pt. Location): Home    Distant Location (provider location):  [facility]     Platform used for Video Visit: [employer]     45 minutes spent on day of encounter doing chart review, history and exam, documentation, and further activities as noted above    "

## 2021-09-04 ENCOUNTER — HEALTH MAINTENANCE LETTER (OUTPATIENT)
Age: 51
End: 2021-09-04

## 2022-02-19 ENCOUNTER — HEALTH MAINTENANCE LETTER (OUTPATIENT)
Age: 52
End: 2022-02-19

## 2022-03-24 DIAGNOSIS — G47.33 OBSTRUCTIVE SLEEP APNEA (ADULT) (PEDIATRIC): Primary | ICD-10-CM

## 2022-07-13 ENCOUNTER — OFFICE VISIT (OUTPATIENT)
Dept: SLEEP MEDICINE | Facility: CLINIC | Age: 52
End: 2022-07-13
Payer: COMMERCIAL

## 2022-07-13 VITALS
HEART RATE: 65 BPM | SYSTOLIC BLOOD PRESSURE: 108 MMHG | HEIGHT: 70 IN | WEIGHT: 232 LBS | OXYGEN SATURATION: 95 % | DIASTOLIC BLOOD PRESSURE: 69 MMHG | BODY MASS INDEX: 33.21 KG/M2

## 2022-07-13 DIAGNOSIS — G47.39 COMPLEX SLEEP APNEA SYNDROME: Primary | ICD-10-CM

## 2022-07-13 DIAGNOSIS — G47.00 INSOMNIA, UNSPECIFIED TYPE: ICD-10-CM

## 2022-07-13 PROBLEM — F41.9 ANXIETY AND DEPRESSION: Status: ACTIVE | Noted: 2017-01-12

## 2022-07-13 PROBLEM — N52.9 ERECTILE DYSFUNCTION: Status: ACTIVE | Noted: 2018-04-10

## 2022-07-13 PROBLEM — F32.A ANXIETY AND DEPRESSION: Status: ACTIVE | Noted: 2017-01-12

## 2022-07-13 PROCEDURE — 99215 OFFICE O/P EST HI 40 MIN: CPT | Performed by: PHYSICIAN ASSISTANT

## 2022-07-13 RX ORDER — TRAZODONE HYDROCHLORIDE 50 MG/1
TABLET, FILM COATED ORAL
Qty: 60 TABLET | Refills: 1 | Status: SHIPPED | OUTPATIENT
Start: 2022-07-13 | End: 2022-07-22 | Stop reason: SINTOL

## 2022-07-13 NOTE — PROGRESS NOTES
[facility]   Outpatient Sleep Medicine  Jul 13, 2022       Name: Frandy Interiano MRN# [id number]   Age: 52 year old YOB: 1970            Assessment and Plan:   1. Complex sleep apnea syndrome  2. Insomnia, unspecified type  Patient's sleep apnea is adequately managed and well treated with current ASV settings with low residual AHI of 2.5 events per hour. Compliance is ok.  Recently has been having of flare of insomnia and often ends up taking his mask off after a couple hours of sleep when he wakes up and end up sleeping remainder of the night without it on.  Requesting medication management for his insomnia.  We also briefly discussed cognitive behavioral therapy for insomnia given that this is a recurring problem but did not appear interested today.  Instead we agreed to start trial of trazodone 50 mg at bedtime, can increase to 100 mg at bedtime if needed.  Believes he may have been started on this in the past but only used for a day or 2 before deemed ineffective.  Agreed to retrial today and to give it more time.  Given his history of anxiety/depression may be helpful.  We will plan to see him back in about a month to review progress on the trazodone.  Prescription was renewed for mask and supplies today on ASV.  - Comprehensive DME       Chief Complaint      Chief Complaint   Patient presents with     RECHECK     Patient here for annual visit, also would like to discuss getting medication to help him stay asleep at night.           History of Present Illness:     Frandy Interiano is a 52 year old male who presents to the clinic for follow-up of their severe complexed sleep apnea treated with ASV. Other past medical history significant for anxiety, depression, allergic rhinitis, obesity.      Patient was originally diagnosed with complex sleep apnea via PSG on 10/18/2007 with AHI 62.1/hr (OSMAR 24/hr) and treatment initiated withauto-CPAP and BiPAP but failed. He had repeat  "sleep study dated 9/24/16 at [facility] revealing AHI 89/hour (OSMAR was 57/hr), lowest SaO2 was 81%. He was prescribed ResMed ASV (Adaptive Servo Ventilation) with settingEPAP 7-11, PS 3-8 cmH2O. Patient was intolerant with his ASV device, 2 hours every night. His clinic visit 5/24/17, his ASV pressures was changed to EPAP 7-10 and pressure support 0-5 cm H2O, in addition recommended REMzzzs mask liners to minimize leak. Later changed ASV pressures to EPAP 6-9 and PS 3-8 cmH2O as he felt bloated with air. His first clinic visit with our practice was 2/9/19 and patient had a residual AHI of 9.8 events an hour and he was intolerant with his ASV machine, only able to wear for 1 hour.  changed his pressures to EPAP min 6, EPAP max 9, PS min 0 and PS max 19 cmH2O. Echo and brain MRI ordered for central apnea workup, both unrevealing and normal.     Returns today for routine follow-up.  Has no complaints when it comes to his ASV today.  Using a full facemask.  No snoring or gasping with mask on.  Pressures are comfortable.    Requesting medication management for insomnia today.  Has struggled on and off with insomnia for years, started when he was in the .  Previously tried Benadryl, Atarax, melatonin but never found these effective.  Recalls that Benadryl and Atarax actually caused hyperactivity.  Melatonin \"just did not work\".  States \"I would like to get something that is gentle enough to help me get over the hump\".  Reports that he falls asleep 'just fine\" but ends up waking an hour or 2 after and at that time typically takes off his mask off.  Does not think that it is a mask or pressure problem but he does not like the hose placement when awake.  States \"sometimes I roll over and I am back to sleep right away or sometimes I stare at the ceiling but its not god quality sleep without my mask\". He wants \"just a little something so that I can tolerate it better\".  Has recently " "tried CBD and finds significant benefit in his sleep and tolerance of keeping the mask on when he returns to sleep when he takes this, but it is too expensive for him to keep up, about $100 per month.  Recalls benzodiazepine and opioid medications when prescribed for other things in the past have been helpful for his sleep.    Is slightly more of a night owl.  Typically gets into bed at 1230-1:00 AM and wakes at 7:30 AM.    ResMed ASV Auto Min EPAP 6cm, Max EPAP 9cm, Min PS 0cm, Max PS [id number] download (6/12/22-7/11/22): 28 total days of use. 2 nonuse days. 19 days with >4 hours use.  Average use 5 hours 3 minutes per day. Median Leak 0 L/min. 95%ile Leak 2.5 L/min. AHI 2.5.     SCALES:   INSOMNIA: Insomnia Severity Score: 12   SLEEPINESS: [place] Sleepiness Score: 10    Past medical/surgical history, family history, social history, medications and allergies were reviewed.           Physical Examination:   /69   Pulse 65   Ht 1.778 m (5' 10\")   Wt 105.2 kg (232 lb)   SpO2 95%   BMI 33.29 kg/m    General appearance: Awake, alert, cooperative. Well groomed. Sitting comfortably in chair. In no apparent distress.  HEENT: Head: Normocephalic, atraumatic. Eyes:Conjunctiva clear. Sclera normal.  Remainder of face covered by mask  Pulmonary:  Able to speak easily in full sentences. No cough or wheeze.   Skin:  No rashes or significant lesions on visible skin.   Neurologic: Alert, oriented x3.   Psychiatric: Mood euthymic. Affect congruent with full range and intensity.      CC:  Nai Gayle PA-C  Jul 13, 2022     [facility]  [address] , [place] [address]     [facility]  [address] [place] [id number]    Chart documentation was completed, in part, with [employer] voice-recognition software. Even though reviewed, some grammatical, spelling, and word errors may remain.      48 minutes spent on day of encounter doing " chart review, history and exam, documentation, and further activities as noted above

## 2022-07-13 NOTE — NURSING NOTE
"Chief Complaint   Patient presents with     RECHECK     Patient here for annual visit, also would like to discuss getting medication to help him stay asleep at night.        Initial /69   Pulse 65   Ht 1.778 m (5' 10\")   Wt 105.2 kg (232 lb)   SpO2 95%   BMI 33.29 kg/m   Estimated body mass index is 33.29 kg/m  as calculated from the following:    Height as of this encounter: 1.778 m (5' 10\").    Weight as of this encounter: 105.2 kg (232 lb).    Medication Reconciliation: complete        DME: aircurve bipap 10 on modem uses [facility]    DONNIE 12  ESS 10  "

## 2022-07-13 NOTE — PATIENT INSTRUCTIONS
Your BMI is Body mass index is 33.29 kg/m .    What is BMI?  Body mass index (BMI) is one way to tell whether you are at a healthy weight, overweight, or obese. It measures your weight in relation to your height.  A BMI of 18.5 to 24.9 is in the healthy range. A person with a BMI of 25 to 29.9 is considered overweight, and someone with a BMI of 30 or greater is considered obese.  Another way to find out if you are at risk for health problems caused by overweight and obesity is to measure your waist. If you are a woman and your waist is more than 35 inches, or if you are a man and your waist is more than 40 inches, your risk of disease may be higher.  More than two-thirds of American adults are considered overweight or obese. Being overweight or obese increases the risk for further weight gain.  Excess weight may lead to heart disease and diabetes. Creating and following plans for healthy eating and physical activity may help you improve your health.    Methods for maintaining or losing weight.  Weight control is part of healthy lifestyle and includes exercise, emotional health, and healthy eating habits.  Careful eating habits lifelong is the mainstay of weight control.  Though there are significant health benefits from weight loss, long-term weight loss with diet alone may be very difficult to achieve- studies show long-term success with dietary management in less than 10% of people. Attaining a healthy weight may be especially difficult to achieve in those with severe obesity. In some cases, medications, devices and surgical management might be considered.    What can you do?  If you are overweight or obese and are interested in methods for weight loss, you should discuss this with your provider. In addition, we recommend that you review healthy life styles and methods for weight loss available through the National Institutes of Health patient information sites:    http://win.niddk.nih.gov/publications/index.htm  Your blood pressure was checked while you were in clinic today.  Please read the guidelines below about what these numbers mean and what you should do about them.  Your systolic blood pressure is the top number.  This is the pressure when the heart is pumping.  Your diastolic blood pressure is the bottom number.  This is the pressure in between beats.  If your systolic blood pressure is less than 120 and your diastolic blood pressure is less than 80, then your blood pressure is normal. There is nothing more that you need to do about it  If your systolic blood pressure is 120-139 or your diastolic blood pressure is 80-89, your blood pressure may be higher than it should be.  You should have your blood pressure re-checked within a year by a primary care provider.  If your systolic blood pressure is 140 or greater or your diastolic blood pressure is 90 or greater, you may have high blood pressure.  High blood pressure is treatable, but if left untreated over time it can put you at risk for heart attack, stroke, or kidney failure.  You should have your blood pressure re-checked by a primary care provider within the next four weeks.

## 2022-07-18 ENCOUNTER — MYC MEDICAL ADVICE (OUTPATIENT)
Dept: SLEEP MEDICINE | Facility: CLINIC | Age: 52
End: 2022-07-18

## 2022-07-22 RX ORDER — DOXEPIN 3 MG/1
3 TABLET, FILM COATED ORAL
Qty: 30 TABLET | Refills: 0 | Status: SHIPPED | OUTPATIENT
Start: 2022-07-22 | End: 2022-08-21

## 2022-09-18 ASSESSMENT — SLEEP AND FATIGUE QUESTIONNAIRES
HOW LIKELY ARE YOU TO NOD OFF OR FALL ASLEEP IN A CAR, WHILE STOPPED FOR A FEW MINUTES IN TRAFFIC: WOULD NEVER DOZE
HOW LIKELY ARE YOU TO NOD OFF OR FALL ASLEEP WHILE SITTING INACTIVE IN A PUBLIC PLACE: WOULD NEVER DOZE
HOW LIKELY ARE YOU TO NOD OFF OR FALL ASLEEP WHILE LYING DOWN TO REST IN THE AFTERNOON WHEN CIRCUMSTANCES PERMIT: MODERATE CHANCE OF DOZING
HOW LIKELY ARE YOU TO NOD OFF OR FALL ASLEEP WHEN YOU ARE A PASSENGER IN A CAR FOR AN HOUR WITHOUT A BREAK: WOULD NEVER DOZE
HOW LIKELY ARE YOU TO NOD OFF OR FALL ASLEEP WHILE SITTING AND TALKING TO SOMEONE: WOULD NEVER DOZE
HOW LIKELY ARE YOU TO NOD OFF OR FALL ASLEEP WHILE SITTING QUIETLY AFTER LUNCH WITHOUT ALCOHOL: SLIGHT CHANCE OF DOZING
HOW LIKELY ARE YOU TO NOD OFF OR FALL ASLEEP WHILE SITTING AND READING: MODERATE CHANCE OF DOZING
HOW LIKELY ARE YOU TO NOD OFF OR FALL ASLEEP WHILE WATCHING TV: MODERATE CHANCE OF DOZING

## 2022-09-21 ENCOUNTER — OFFICE VISIT (OUTPATIENT)
Dept: SLEEP MEDICINE | Facility: CLINIC | Age: 52
End: 2022-09-21
Payer: COMMERCIAL

## 2022-09-21 VITALS
DIASTOLIC BLOOD PRESSURE: 67 MMHG | OXYGEN SATURATION: 98 % | BODY MASS INDEX: 32.78 KG/M2 | WEIGHT: 229 LBS | HEIGHT: 70 IN | SYSTOLIC BLOOD PRESSURE: 105 MMHG | HEART RATE: 65 BPM

## 2022-09-21 DIAGNOSIS — G47.39 COMPLEX SLEEP APNEA SYNDROME: Primary | ICD-10-CM

## 2022-09-21 DIAGNOSIS — G47.00 INSOMNIA, UNSPECIFIED TYPE: ICD-10-CM

## 2022-09-21 PROCEDURE — 99215 OFFICE O/P EST HI 40 MIN: CPT | Performed by: PHYSICIAN ASSISTANT

## 2022-09-21 NOTE — PROGRESS NOTES
[facility]   Outpatient Sleep Medicine  Sep 21, 2022       Name: Frandy Interiano MRN# [id number]   Age: 52 year old YOB: 1970            Assessment and Plan:   1. Complex sleep apnea syndrome  2. Insomnia, unspecified type  Patient's sleep apnea continues to be well treated on ASV with residual AHI 1.5.  Compliance is okay, average use just shy of 5 hours per night secondary to mask intolerance, in particular placement of the hose on his current mask.  He is not interested in trial of new mask, feels that his current fullface mask is the best fitting mask he will get. We tried both trazodone and doxepin to see if sleep aid could help him sleep the night and keep his mask on but unfortunately these were not effective.  Discussed we could try another sleep aid, such as Ambien, to see if this would help him stay asleep but not something we like to use long term. He was not interested today and I am not confident would be answer either.  Patient reports prior use of CBD was helpful for him but stopped due to cost. Plans to look into this again. He will continue to work on tolerance and wearing mask as much as he can each night. Prescription renewed for mask/supplies today. Follow-up annually for routine PAP follow-up or sooner as needed.   - Comprehensive DME       Chief Complaint      Chief Complaint   Patient presents with     Sleep Problem          History of Present Illness:     Frandy Interiano is a 52 year old male who presents to the clinic for follow-up of their severe complexed sleep apnea treated with ASV and insomnia treated with doxepin. Other past medical history significant for anxiety, depression, allergic rhinitis, obesity.      Patient was originally diagnosed with complex sleep apnea via PSG on 10/18/2007 with AHI 62.1/hr (OSMAR 24/hr) and treatment initiated with auto-CPAP and BiPAP but failed. He had repeat sleep study dated 9/24/16 at [facility] revealing AHI 89/hour (OSMAR was 57/hr), lowest SaO2 was 81%. He was prescribed ResMed ASV (Adaptive Servo Ventilation) with settingEPAP 7-11, PS 3-8 cmH2O. Patient was intolerant with his ASV device, 2 hours every night. His clinic visit 5/24/17, his ASV pressures was changed to EPAP 7-10 and pressure support 0-5 cm H2O, in addition recommended REMzzzs mask liners to minimize leak. Later changed ASV pressures to EPAP 6-9 and PS 3-8 cmH2O as he felt bloated with air. His first clinic visit with our practice was 2/9/19 and patient had a residual AHI of 9.8 events an hour and he was intolerant with his ASV machine, only able to wear for 1 hour.  changed his pressures to EPAP min 6, EPAP max 9, PS min 0 and PS max 19 cmH2O. Echo and brain MRI ordered for central apnea workup, both unrevealing and normal.     Last seen 7/13/22. At that time was having no concerns with ASV, tolerating well and residual AHI low at 2.5.  His primary concern was insomnia and he requested medication management.  He has a longstanding history of insomnia that waxes and wanes over the years, started when he was in the . Previously tried Benadryl, Atarax, melatonin but never found these effective.  Recalls that Benadryl and Atarax actually caused hyperactivity.  Melatonin \"just did not work\".  Stated \"I would like to get something that is gentle enough to help me get over the hump\".  No difficulty falling asleep but ends up waking an hour or 2 after and at that time typically takes off his mask off.  Does not think that it is a mask or pressure problem but he does not like the hose placement when awake.  States \"sometimes I roll over and I am back to sleep right away or sometimes I stare at the ceiling but its not good quality sleep without my mask\". He wants \"just a little something so that I can tolerate it better\". Tried CBD and finds significant benefit in his sleep and tolerance of keeping the mask on when he returns " "to sleep when he takes this, but it is too expensive for him to keep up, about $100 per month.  Recalls benzodiazepine and opioid medications when prescribed for other things in the past had been helpful for his sleep. Discussed CBT-I given this is a chronic problem but he was not interested.  Agreed instead to trial trazodone 50 mg at bedtime increasing to 100 mg if needed.  He tried this for a couple nights but it made him jittery and felt that he could not sleep with it, \"makes my heart feel like it is beating out of my chest\".  Switched to trial of doxepin 3 mg and returns today to discuss progress.     Doxepin \"did nothing\". Took it regularly for 2 weeks and felt no benefit so stopped. Continues to struggle staying asleep with mask. Falls asleep easily but wakes after 1-2 hours and doesn't like hose placement. Unwilling to look in to new mask, reports has tried several over the years and current FFM is \"the best there is\". About 25% of the time no issues returning to sleep but 75% of the time takes mask off and sleeps without it. If wakes later in night will put mask back on but sometimes doesn't wake until morning.     ResMed ASV Auto Min EPAP 6cm, Max EPAP 9cm, Min PS 0cm, Max PS [id number] download (8/21/22-9/19/22): 29 total days of use. 1 nonuse days. 18 days with >4 hours use.  Average use 4 hours 53 minutes per day. Median Leak 0.0 L/min. 95%ile Leak 1.6 L/min. AHI 1.5    SCALES:   INSOMNIA: Insomnia Severity Score: 13/28   SLEEPINESS: [place] Sleepiness Score: 7/24    Past medical/surgical history, family history, social history, medications and allergies were reviewed.           Physical Examination:   /67   Pulse 65   Ht 1.778 m (5' 10\")   Wt 103.9 kg (229 lb)   SpO2 98%   BMI 32.86 kg/m    General appearance: Awake, alert, cooperative. Well groomed. Sitting comfortably in chair. In no apparent distress.  HEENT: Head: Normocephalic, atraumatic. Eyes:Conjunctiva clear. Sclera normal. Remainder " of face covered by mask.   Pulmonary:  Able to speak easily in full sentences. No cough or wheeze.   Skin:  No rashes or significant lesions on visible skin.   Neurologic: Alert, oriented x3.   Psychiatric: Mood euthymic. Affect congruent with full range and intensity.      CC:  Nai Gayle PA-C  Sep 21, 2022     [facility]  [address] [place] [address]     [facility]  [address] [address]    Chart documentation was completed, in part, with [employer] voice-recognition software. Even though reviewed, some grammatical, spelling, and word errors may remain.      52 minutes spent on day of encounter doing chart review, history and exam, documentation, and further activities as noted above

## 2022-09-21 NOTE — PATIENT INSTRUCTIONS

## 2022-10-16 ENCOUNTER — HEALTH MAINTENANCE LETTER (OUTPATIENT)
Age: 52
End: 2022-10-16

## 2022-12-19 ENCOUNTER — TELEPHONE (OUTPATIENT)
Dept: SLEEP MEDICINE | Facility: CLINIC | Age: 52
End: 2022-12-19

## 2022-12-19 NOTE — TELEPHONE ENCOUNTER
Patient stopped in to the [facility] today, patient is requesting his DME orders for Cpap supplies be transferred to [facility]. Orders, facesheet, psg, and chart note faxed to [facility] at fax. [phone]. Phone [phone].

## 2023-02-09 ENCOUNTER — OFFICE VISIT (OUTPATIENT)
Dept: SLEEP MEDICINE | Facility: CLINIC | Age: 53
End: 2023-02-09
Payer: COMMERCIAL

## 2023-02-09 VITALS
HEART RATE: 61 BPM | WEIGHT: 236 LBS | BODY MASS INDEX: 33.79 KG/M2 | SYSTOLIC BLOOD PRESSURE: 106 MMHG | OXYGEN SATURATION: 96 % | HEIGHT: 70 IN | DIASTOLIC BLOOD PRESSURE: 68 MMHG

## 2023-02-09 DIAGNOSIS — G47.39 COMPLEX SLEEP APNEA SYNDROME: Primary | ICD-10-CM

## 2023-02-09 PROCEDURE — 99214 OFFICE O/P EST MOD 30 MIN: CPT | Performed by: PHYSICIAN ASSISTANT

## 2023-02-09 RX ORDER — ALBUTEROL SULFATE 90 UG/1
1-2 AEROSOL, METERED RESPIRATORY (INHALATION)
COMMUNITY
Start: 2022-01-06

## 2023-02-09 NOTE — PROGRESS NOTES
"[facility]   Outpatient Sleep Medicine  Feb 9, 2023       Name: Frandy Interiano MRN# [id number]   Age: 52 year old YOB: 1970            Assessment and Plan:   1. Complex sleep apnea syndrome  Presents to clinic today to discuss sensation of \"chest heaviness\" that he can get a couple times per week. Most of the time when this happens it wakes him up from sleep when he is wearing his PAP, however, sometimes can happen without mask on. Discussed could be too much air and pressure adjustment may be worthwhile trying but may not be the whole picture since it can last all day (if pressure related would expect to resolve shortly after taking mask off) and again can happen without wearing the mask. Ultimately agreed to pressure change today Min EPAP 4cm, Max EPAP 9cm, Min PS 0cm, Max PS 15 to see if this helps. He will also follow-up with PCP for discussion of alternative explanations if not resolved with pressure adjustment. He was slightly anxious today which may not be helping with heaviness sensation.   - Comprehensive DME       Chief Complaint      Chief Complaint   Patient presents with     CPAP Follow Up          History of Present Illness:     Frandy Interiano is a 52 year old male with anxiety, depression, allergic rhinitis, obesity who presents to the clinic for follow-up of their severe complex sleep apnea treated with ASV.     Patient was originally diagnosed with complex sleep apnea via PSG on 10/18/2007 with AHI 62.1/hr (OSMAR 24/hr) and treatment initiated with auto-CPAP and BiPAP but failed. He had repeat sleep study dated 9/24/16 at [facility] revealing AHI 89/hour (OSMAR was 57/hr), lowest SaO2 was 81%. He was prescribed ResMed ASV (Adaptive Servo Ventilation) with settingEPAP 7-11, PS 3-8 cmH2O. Patient was intolerant with his ASV device, 2 hours every night. His clinic visit 5/24/17, his ASV pressures was changed to EPAP 7-10 and pressure support " "0-5 cm H2O, in addition recommended REMzzzs mask liners to minimize leak. Later changed ASV pressures to EPAP 6-9 and PS 3-8 cmH2O as he felt bloated with air. His first clinic visit with our practice was 2/9/19 and patient had a residual AHI of 9.8 events an hour and he was intolerant with his ASV machine, only able to wear for 1 hour.  changed his pressures to EPAP min 6, EPAP max 9, PS min 0 and PS max 19 cmH2O. Echo and brain MRI ordered for central apnea workup, both unrevealing and normal.     Presents to clinic today to discuss recent sensation of \"feeling like someone is standing on my chest\". This happens a couple times per week, often wakes him up from sleep, if takes mask off sometimes helpful and sometimes not. Chest heaviness can last all day. Feels the pressures may be too high and causing this sensation. If he sleeps without his mask on however he can also get this sensation. Denies chest heaviness with exertion but admits that he gets winded quickly with exertion \"sometimes\". His PCP retired but had noted concern of ashtma. Wonders if asthma can cause this heaviness. Has albuterol and used this AM but not obviously helpful.     Recently switched masks a couple weeks ago to F30i. Seems to be doing well.     ResMed ASV Auto Min EPAP 6cm, Max EPAP 9cm, Min PS 0cm, Max PS [id number] download (1/10/23-2/8/23):  30 total days of use. 0 nonuse days. 26 days with >4 hours use.  Average use 5 hours 46 minutes per day. Median Leak 0.3 L/min. 95%ile Leak 3.2 L/min. AHI 1.1.     SCALES:   INSOMNIA: Insomnia Severity Score: 13   SLEEPINESS: [place] Sleepiness Score: 7    Past medical/surgical history, family history, social history, medications and allergies were reviewed.           Physical Examination:   /68   Pulse 61   Ht 1.778 m (5' 10\")   Wt 107 kg (236 lb)   SpO2 96%   BMI 33.86 kg/m    General appearance: Awake, alert, cooperative. Well groomed. Sitting comfortably in chair. In no apparent " distress.  HEENT: Head: Normocephalic, atraumatic. Eyes:Conjunctiva clear. Sclera normal. Remainder of face covered by mask.   Cardiovascular: Regular rate and rhythm. Regular S1 and S2. No gallops or murmurs.  Pulmonary: Clear to auscultation bilaterally. No crackles, wheezes or rales.  Skin:  No rashes or significant lesions on visible skin.   Neurologic: Alert, oriented x3. No focal neurological deficit. Gait normal.   Psychiatric: Mood euthymic. Affect congruent with full range and intensity.    CC:  Nai Gayle PA-C  Feb 9, 2023     [facility]  [address] [place] [address]     [facility]  [address] [address]    Chart documentation was completed, in part, with [employer] voice-recognition software. Even though reviewed, some grammatical, spelling, and word errors may remain.    34 minutes spent on day of encounter doing chart review, history and exam, documentation, and further activities as noted above

## 2023-02-09 NOTE — NURSING NOTE
"Chief Complaint   Patient presents with     CPAP Follow Up       Initial /68   Pulse 61   Ht 1.778 m (5' 10\")   Wt 107 kg (236 lb)   SpO2 96%   BMI 33.86 kg/m   Estimated body mass index is 33.86 kg/m  as calculated from the following:    Height as of this encounter: 1.778 m (5' 10\").    Weight as of this encounter: 107 kg (236 lb).    Medication Reconciliation: complete  ESS 8  Tabitha Bajwa MA  "

## 2023-04-01 ENCOUNTER — HEALTH MAINTENANCE LETTER (OUTPATIENT)
Age: 53
End: 2023-04-01

## 2024-06-01 ENCOUNTER — HEALTH MAINTENANCE LETTER (OUTPATIENT)
Age: 54
End: 2024-06-01

## 2024-09-10 ENCOUNTER — TELEPHONE (OUTPATIENT)
Dept: SLEEP MEDICINE | Facility: CLINIC | Age: 54
End: 2024-09-10
Payer: COMMERCIAL

## 2024-09-10 NOTE — TELEPHONE ENCOUNTER
Order/Referral Request    Who is requesting: pt    Orders being requested: cpap supplies    Reason service is needed/diagnosis: na    When are orders needed by: asap    Has this been discussed with Provider: No    Does patient have a preference on a Group/Provider/Facility? na    Does patient have an appointment scheduled?: No    Where to send orders: Fax and Place orders within Epic    Could we send this information to you in [facility] or would you prefer to receive a phone call?:   Patient would prefer a phone call   Okay to leave a detailed message?: Yes at Cell number on file:    Telephone Information:   Mobile [phone]

## 2025-06-14 ENCOUNTER — HEALTH MAINTENANCE LETTER (OUTPATIENT)
Age: 55
End: 2025-06-14

## 2025-08-14 ENCOUNTER — OFFICE VISIT (OUTPATIENT)
Dept: FAMILY MEDICINE | Facility: CLINIC | Age: 55
End: 2025-08-14
Payer: COMMERCIAL

## 2025-08-14 VITALS
WEIGHT: 233.1 LBS | BODY MASS INDEX: 33.37 KG/M2 | RESPIRATION RATE: 12 BRPM | DIASTOLIC BLOOD PRESSURE: 70 MMHG | HEIGHT: 70 IN | HEART RATE: 70 BPM | SYSTOLIC BLOOD PRESSURE: 105 MMHG | TEMPERATURE: 97.8 F | OXYGEN SATURATION: 97 %

## 2025-08-14 DIAGNOSIS — M54.2 CHRONIC NECK AND BACK PAIN: ICD-10-CM

## 2025-08-14 DIAGNOSIS — L29.0 PERIANAL PRURITUS: Primary | ICD-10-CM

## 2025-08-14 DIAGNOSIS — K64.4 EXTERNAL HEMORRHOIDS: ICD-10-CM

## 2025-08-14 DIAGNOSIS — E66.811 CLASS 1 OBESITY WITHOUT SERIOUS COMORBIDITY WITH BODY MASS INDEX (BMI) OF 33.0 TO 33.9 IN ADULT, UNSPECIFIED OBESITY TYPE: ICD-10-CM

## 2025-08-14 DIAGNOSIS — G89.29 CHRONIC NECK AND BACK PAIN: ICD-10-CM

## 2025-08-14 DIAGNOSIS — M54.9 CHRONIC NECK AND BACK PAIN: ICD-10-CM

## 2025-08-14 RX ORDER — HYDROCORTISONE 25 MG/G
CREAM TOPICAL 2 TIMES DAILY PRN
Qty: 30 G | Refills: 0 | Status: SHIPPED | OUTPATIENT
Start: 2025-08-14

## 2025-08-14 ASSESSMENT — PAIN SCALES - GENERAL: PAINLEVEL_OUTOF10: MILD PAIN (3)

## 2025-08-19 ENCOUNTER — THERAPY VISIT (OUTPATIENT)
Dept: PHYSICAL THERAPY | Facility: CLINIC | Age: 55
End: 2025-08-19
Attending: PEDIATRICS
Payer: COMMERCIAL

## 2025-08-19 DIAGNOSIS — M54.9 CHRONIC NECK AND BACK PAIN: ICD-10-CM

## 2025-08-19 DIAGNOSIS — G89.29 CHRONIC NECK AND BACK PAIN: ICD-10-CM

## 2025-08-19 DIAGNOSIS — M54.2 CHRONIC NECK AND BACK PAIN: ICD-10-CM

## 2025-08-19 PROCEDURE — 97161 PT EVAL LOW COMPLEX 20 MIN: CPT | Mod: GP | Performed by: PHYSICAL THERAPIST

## 2025-08-19 PROCEDURE — 97110 THERAPEUTIC EXERCISES: CPT | Mod: GP | Performed by: PHYSICAL THERAPIST
